# Patient Record
Sex: MALE | Race: WHITE
[De-identification: names, ages, dates, MRNs, and addresses within clinical notes are randomized per-mention and may not be internally consistent; named-entity substitution may affect disease eponyms.]

---

## 2018-12-04 ENCOUNTER — HOSPITAL ENCOUNTER (EMERGENCY)
Dept: HOSPITAL 74 - JER | Age: 58
LOS: 1 days | Discharge: HOME | End: 2018-12-05
Payer: COMMERCIAL

## 2018-12-04 VITALS — SYSTOLIC BLOOD PRESSURE: 144 MMHG | DIASTOLIC BLOOD PRESSURE: 94 MMHG | HEART RATE: 99 BPM | TEMPERATURE: 97.7 F

## 2018-12-04 VITALS — BODY MASS INDEX: 36.6 KG/M2

## 2018-12-04 DIAGNOSIS — M25.561: ICD-10-CM

## 2018-12-04 DIAGNOSIS — M19.90: ICD-10-CM

## 2018-12-04 DIAGNOSIS — Q85.00: ICD-10-CM
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
## 2018-12-04 DIAGNOSIS — Z99.89: ICD-10-CM

## 2018-12-04 DIAGNOSIS — M25.512: ICD-10-CM
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
## 2018-12-04 DIAGNOSIS — M25.562: Primary | ICD-10-CM

## 2018-12-04 DIAGNOSIS — R26.89: ICD-10-CM

## 2018-12-04 DIAGNOSIS — G62.9: ICD-10-CM
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
## 2018-12-04 DIAGNOSIS — Z91.81: ICD-10-CM
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
## 2018-12-04 DIAGNOSIS — R26.81: ICD-10-CM

## 2018-12-04 DIAGNOSIS — M25.511: ICD-10-CM

## 2018-12-04 NOTE — PDOC
Attending Attestation





- Resident


Resident Name: Jerry Moreno





- ED Attending Attestation


I have performed the following: I have examined & evaluated the patient, The 

case was reviewed & discussed with the resident, I agree w/resident's findings 

& plan, Exceptions are as noted





- HPI


HPI: 





12/04/18 20:18


Mr Vera is a 56 yo yo M h/o Neurofibromatosis, peripheral neuropathy, 

arthritis who presents to the ER via ems s/p fall down stairs


Pt was in his usual state of health when he sustained a slip and fall down 15 

wooden stairs (not carpeted)


No LOC, No amnesia


Unlikely head trauma


Pt was unable to get himself to standing 


His roommate called EMS


Pt has a history of arthritis and has worsening shoulder, knee and back pain


[No headache


Of note: pt states that he has been falling more frequently - he estimates that 

he has fallen 25 times since July


Most recent fall was a few days ago in which he sustained right foot pain (

evaluated at the urgent care where x rays were reportedly negative)


Pt has been ambulatory with the assistance of a walker


12/04/18 20:23





12/04/18 20:39








- Physicial Exam


PE: 





12/04/18 20:31


Multiple neurofibromas readily apparent on visual inspection


A & O x 3


No external signs of head trauma


PERRLA, EOMI


RRR


CTA b/l


No abd tenderness


Pelvis is stable


Moves extremities with no limitations


No deformities noted


Right foot swollen, bruised, diffusely tender to palpation





- Medical Decision Making





12/04/18 20:36


S/p mechanical fall


Pt has increasing number of falls over the past few months


Currently denies major injury





Will do:


CT head and C spine


Xrays


Pain medications 


Re assess








*DC/Admit/Observation/Transfer


Diagnosis at time of Disposition: 


 Musculoskeletal pain





Fall down stairs


Qualifiers:


 Encounter type: initial encounter Qualified Code(s): W10.8XXA - Fall (on) (from

) other stairs and steps, initial encounter








- Discharge Dispostion


Disposition: HOME


Condition at time of disposition: Stable


Decision to Admit order: No





- Referrals


Referrals: 


Kb Mcclelland MD [Primary Care Provider] - 


Kb Dominguez MD [Staff Physician] - 


Yani Sun MD [Staff Physician] - 


Royer Sandra MD [Staff Physician] - 





- Patient Instructions


Printed Discharge Instructions:  How to Prevent Falls, DI for Musculoskeletal 

Pain


Additional Instructions: 


Thank you for coming in to the ER today


Please be sure to follow up with your primary care physician


Please review your CT reports, the x ray reports will be available for review 

tomorrow


Please take medications as prescribed while supervised (to avoid becoming 

drowsy and increasing your risk of falling)


Please return to the ER for any other concerns or complaints








- Post Discharge Activity

## 2018-12-04 NOTE — PDOC
History of Present Illness





- General


Chief Complaint: Injury


Stated Complaint: FALL


Time Seen by Provider: 12/04/18 19:38





- History of Present Illness


Initial Comments: 





12/04/18 19:59


Mr. Vera is a 58 yo male w/ pmh of neurofibromatosis, osteoarthritis, and 

frequent falls 2/2 balance issues who presents s/p fall earlier today. Patient 

denies head injury or LOC however reports he fell down the stairs due to his 

normal balance issues. Patient is currently complaining of pain to his L/R 

knees and L/R shoulders. Patient has no other complaints at this time.





The patient denies chest pain, shortness of breath, and headache. Denies fever, 

chills, nausea, vomit, diarrhea and constipation. Denies dysuria, frequency, 

urgency and hematuria. 





Past History





- Past Medical History


Allergies/Adverse Reactions: 


 Allergies











Allergy/AdvReac Type Severity Reaction Status Date / Time


 


No Known Allergies Allergy   Verified 12/04/18 19:36











Home Medications: 


Ambulatory Orders





Acetaminophen W/ Codeine #3 [Tylenol # 3 -] 1 tab PO TID PRN #12 tablet MDD 3 12 /04/18 


Cholecalciferol (Vitamin D3) [Vitamin D3] 1,000 cap DAILY 12/04/18 


Cyanocobalamin (Vitamin B-12) [Vitamin B-12] 1,000 mcg PO DAILY 12/04/18 


Ibuprofen [Motrin -] 400 mg PO Q6H 12/04/18 


Levothyroxine [Synthroid -] 112 mcg PO DAILY 12/04/18 











- Suicide/Smoking/Psychosocial Hx


Smoking History: Never smoked


Hx Alcohol Use: Yes


Drug/Substance Use Hx: Yes





**Review of Systems





- Review of Systems


Comments:: 





12/04/18 20:01


GENERAL/CONSTITUTIONAL: No fever or chills. No weakness.


HEAD, EYES, EARS, NOSE AND THROAT: No change in vision. No ear pain or 

discharge. No sore throat.


CARDIOVASCULAR: No chest pain or shortness of breath


RESPIRATORY: No cough, wheezing, or hemoptysis.


GASTROINTESTINAL: No nausea, vomiting, diarrhea or constipation.


GENITOURINARY: No dysuria, frequency, or change in urination.


MUSCULOSKELETAL: +MARIELOS Shoulder and knee pain patient reports is chronic however 

exacerbated s/p fall.


SKIN: No rash


NEUROLOGIC: +Frequent balance problems as described. No headache, loss of 

consciousness, or change in strength/sensation.


ENDOCRINE: No increased thirst. No abnormal weight change


HEMATOLOGIC/LYMPHATIC: No anemia, easy bleeding, or history of blood clots.


ALLERGIC/IMMUNOLOGIC: No hives or skin allergy.











*Physical Exam





- Physical Exam


Comments: 





12/04/18 20:01


GENERAL: +Patient obese w/ numerous nodules c/w Neurofibromatosis primarily 

over face and chest. Awake, alert, and fully oriented, in no acute distress


HEAD: No signs of trauma, normocephalic, atraumatic 


EYES: PERRLA, EOMI, sclera anicteric, conjunctiva clear


ENT: Auricles normal inspection, hearing grossly normal, nares patent, 

oropharynx clear without


exudates. Moist mucosa


NECK: Normal ROM, supple, no lymphadenopathy, JVD, or masses


LUNGS: No distress, speaks full sentences, clear to auscultation bilaterally 


HEART: Regular rate and rhythm, normal S1 and S2, no murmurs, rubs or gallops, 

peripheral pulses normal and equal bilaterally. 


ABDOMEN: Soft, nontender, normoactive bowel sounds. No guarding, no rebound. No 

masses


EXTREMITIES: Normal inspection, Normal range of motion, no edema. No clubbing 

or cyanosis. 


NEUROLOGICAL: Cranial nerves II through XII grossly intact. Normal speech, 

normal gait, no focal sensorimotor deficits 


SKIN: Warm, Dry, normal turgor, no rashes or lesions noted. 





Medical Decision Making





- Medical Decision Making





12/04/18 23:57


Mr. Vera is a 58 yo male w/ pmh as described who presents s/p fall. Patient 

alert and oriented and describing pain in joints without other concerning 

factors. XR of knees, shoulders, pelvis, foot sent as well as head and c-spine 

CT. All imaging negative. Discharging to home. Patient will f/u w/ PCP Dr. Mcclelland 

for further evaluation. No concern for acute process at this time.





*DC/Admit/Observation/Transfer


Diagnosis at time of Disposition: 


 Musculoskeletal pain





Fall down stairs


Qualifiers:


 Encounter type: initial encounter Qualified Code(s): W10.8XXA - Fall (on) (from

) other stairs and steps, initial encounter








- Discharge Dispostion


Disposition: HOME


Condition at time of disposition: Stable





- Prescriptions


Prescriptions: 


Acetaminophen W/ Codeine #3 [Tylenol # 3 -] 1 tab PO TID PRN #12 tablet MDD 3


 PRN Reason: Severe Pain





- Referrals


Referrals: 


Royer Sandra MD [Staff Physician] - 


Kb Dominguez MD [Staff Physician] - 


Yani Sun MD [Staff Physician] - 


Kb Mcclelland MD [Primary Care Provider] - 





- Patient Instructions


Printed Discharge Instructions:  How to Prevent Falls, DI for Musculoskeletal 

Pain


Additional Instructions: 


Thank you for coming in to the ER today


Please be sure to follow up with your primary care physician


Please review your CT reports, the x ray reports will be available for review 

tomorrow


Please take medications as prescribed while supervised (to avoid becoming 

drowsy and increasing your risk of falling)


Please return to the ER for any other concerns or complaints








- Post Discharge Activity

## 2024-08-09 ENCOUNTER — HOSPITAL ENCOUNTER (EMERGENCY)
Dept: HOSPITAL 74 - JER | Age: 64
Discharge: HOME | End: 2024-08-09
Payer: COMMERCIAL

## 2024-08-09 VITALS — TEMPERATURE: 97.9 F

## 2024-08-09 VITALS — RESPIRATION RATE: 18 BRPM | HEART RATE: 97 BPM

## 2024-08-09 VITALS — SYSTOLIC BLOOD PRESSURE: 111 MMHG | DIASTOLIC BLOOD PRESSURE: 66 MMHG

## 2024-08-09 VITALS — BODY MASS INDEX: 29.9 KG/M2

## 2024-08-09 DIAGNOSIS — R10.30: ICD-10-CM

## 2024-08-09 DIAGNOSIS — R33.8: ICD-10-CM

## 2024-08-09 DIAGNOSIS — N40.1: Primary | ICD-10-CM

## 2024-08-09 LAB
ALBUMIN SERPL-MCNC: 3.5 G/DL (ref 3.4–5)
ALP SERPL-CCNC: 61 U/L (ref 45–117)
ALT SERPL-CCNC: 17 U/L (ref 13–61)
ANION GAP SERPL CALC-SCNC: 9 MMOL/L (ref 4–13)
APPEARANCE UR: CLEAR
AST SERPL-CCNC: 11 U/L (ref 15–37)
BACTERIA # UR AUTO: 4 /UL (ref 0–1359)
BASOPHILS # BLD: 0.4 % (ref 0–2)
BILIRUB SERPL-MCNC: 0.8 MG/DL (ref 0.2–1)
BILIRUB UR STRIP.AUTO-MCNC: NEGATIVE MG/DL
BUN SERPL-MCNC: 12.7 MG/DL (ref 7–18)
CALCIUM SERPL-MCNC: 9.9 MG/DL (ref 8.5–10.1)
CASTS URNS QL MICRO: 0 /UL (ref 0–3.1)
CHLORIDE SERPL-SCNC: 109 MMOL/L (ref 98–107)
CO2 SERPL-SCNC: 23 MMOL/L (ref 21–32)
COLOR UR: YELLOW
CREAT SERPL-MCNC: 0.8 MG/DL (ref 0.55–1.3)
DEPRECATED RDW RBC AUTO: 13.7 % (ref 11.9–15.9)
EOSINOPHIL # BLD: 0.5 % (ref 0–4.5)
EPITH CASTS URNS QL MICRO: 2 /UL (ref 0–25.1)
GLUCOSE SERPL-MCNC: 95 MG/DL (ref 74–106)
HCT VFR BLD CALC: 41.4 % (ref 35.4–49)
HGB BLD-MCNC: 14.3 GM/DL (ref 11.7–16.9)
INR BLD: 1.19 (ref 0.83–1.09)
KETONES UR QL STRIP: (no result)
LEUKOCYTE ESTERASE UR QL STRIP.AUTO: NEGATIVE
LYMPHOCYTES # BLD: 8.6 % (ref 8–40)
MCH RBC QN AUTO: 32 PG (ref 25.7–33.7)
MCHC RBC AUTO-ENTMCNC: 34.5 G/DL (ref 32–35.9)
MCV RBC: 92.9 FL (ref 80–96)
MONOCYTES # BLD AUTO: 8 % (ref 3.8–10.2)
NEUTROPHILS # BLD: 82.5 % (ref 42.8–82.8)
NITRITE UR QL STRIP: NEGATIVE
PH UR: 5.5 [PH] (ref 5–8)
PLATELET # BLD AUTO: 266 10^3/UL (ref 134–434)
PMV BLD: 8.7 FL (ref 7.5–11.1)
POTASSIUM SERPLBLD-SCNC: 4 MMOL/L (ref 3.5–5.1)
PROT SERPL-MCNC: 6.9 G/DL (ref 6.4–8.2)
PROT UR QL STRIP: NEGATIVE
PROT UR QL STRIP: NEGATIVE
PT PNL PPP: 13.6 SEC (ref 9.7–13)
RBC # BLD AUTO: 37 /UL (ref 0–23.9)
RBC # BLD AUTO: 4.45 M/MM3 (ref 4–5.6)
SODIUM SERPL-SCNC: 140 MMOL/L (ref 136–145)
SP GR UR: 1.01 (ref 1.01–1.03)
UROBILINOGEN UR STRIP-MCNC: 0.2 MG/DL (ref 0.2–1)
WBC # BLD AUTO: 12.7 K/MM3 (ref 4–10)
WBC # UR AUTO: 6 /UL (ref 0–25.8)

## 2024-08-09 PROCEDURE — 3E033NZ INTRODUCTION OF ANALGESICS, HYPNOTICS, SEDATIVES INTO PERIPHERAL VEIN, PERCUTANEOUS APPROACH: ICD-10-PCS | Performed by: STUDENT IN AN ORGANIZED HEALTH CARE EDUCATION/TRAINING PROGRAM

## 2024-08-09 RX ADMIN — ACETAMINOPHEN ONE MG: 10 INJECTION, SOLUTION INTRAVENOUS at 09:30

## 2024-09-04 ENCOUNTER — HOSPITAL ENCOUNTER (OUTPATIENT)
Dept: HOSPITAL 74 - JASUSAT | Age: 64
LOS: 1 days | Discharge: HOME | End: 2024-09-05
Attending: UROLOGY
Payer: COMMERCIAL

## 2024-09-04 DIAGNOSIS — N40.0: Primary | ICD-10-CM

## 2024-09-04 DIAGNOSIS — R33.9: ICD-10-CM

## 2024-09-04 PROCEDURE — A4346 CATH INDW FOLEY 3 WAY: HCPCS

## 2024-09-04 PROCEDURE — 0VT08ZZ RESECTION OF PROSTATE, VIA NATURAL OR ARTIFICIAL OPENING ENDOSCOPIC: ICD-10-PCS | Performed by: UROLOGY

## 2024-09-04 RX ADMIN — ACETAMINOPHEN ONE MG: 10 INJECTION, SOLUTION INTRAVENOUS at 12:16

## 2024-09-04 RX ADMIN — CEFAZOLIN SCH MLS/HR: 2 INJECTION, POWDER, FOR SOLUTION INTRAMUSCULAR; INTRAVENOUS at 19:01

## 2024-09-04 RX ADMIN — ACETAMINOPHEN PRN MG: 325 TABLET ORAL at 21:45

## 2024-09-04 RX ADMIN — SODIUM CHLORIDE, POTASSIUM CHLORIDE, SODIUM LACTATE AND CALCIUM CHLORIDE SCH: 600; 310; 30; 20 INJECTION, SOLUTION INTRAVENOUS at 17:32

## 2024-09-04 RX ADMIN — DEXTROSE AND SODIUM CHLORIDE SCH MLS: 5; 450 INJECTION, SOLUTION INTRAVENOUS at 13:00

## 2024-09-05 VITALS
TEMPERATURE: 98.1 F | HEART RATE: 96 BPM | SYSTOLIC BLOOD PRESSURE: 112 MMHG | RESPIRATION RATE: 20 BRPM | DIASTOLIC BLOOD PRESSURE: 56 MMHG

## 2024-09-05 LAB
ANION GAP SERPL CALC-SCNC: 6 MMOL/L (ref 4–13)
ANISOCYTOSIS BLD QL: 0
BASO STIPL BLD QL SMEAR: (no result)
BASOPHILS # BLD: 0.1 % (ref 0–2)
BUN SERPL-MCNC: 17.1 MG/DL (ref 7–18)
CALCIUM SERPL-MCNC: 8.7 MG/DL (ref 8.5–10.1)
CHLORIDE SERPL-SCNC: 102 MMOL/L (ref 98–107)
CO2 SERPL-SCNC: 27 MMOL/L (ref 21–32)
CREAT SERPL-MCNC: 1 MG/DL (ref 0.55–1.3)
DEPRECATED RDW RBC AUTO: 14.4 % (ref 11.9–15.9)
EOSINOPHIL # BLD: 0.1 % (ref 0–4.5)
GLUCOSE SERPL-MCNC: 135 MG/DL (ref 74–106)
HCT VFR BLD CALC: 26 % (ref 35.4–49)
HGB BLD-MCNC: 8.8 GM/DL (ref 11.7–16.9)
LYMPHOCYTES # BLD: 6.6 % (ref 8–40)
MACROCYTES BLD QL: 0
MCH RBC QN AUTO: 31.7 PG (ref 25.7–33.7)
MCHC RBC AUTO-ENTMCNC: 34 G/DL (ref 32–35.9)
MCV RBC: 93.2 FL (ref 80–96)
MONOCYTES # BLD AUTO: 7.8 % (ref 3.8–10.2)
NEUTROPHILS # BLD: 85.4 % (ref 42.8–82.8)
PLATELET # BLD AUTO: 333 10^3/UL (ref 134–434)
PMV BLD: 8.7 FL (ref 7.5–11.1)
POTASSIUM SERPLBLD-SCNC: 3.8 MMOL/L (ref 3.5–5.1)
RBC # BLD AUTO: 2.79 M/MM3 (ref 4–5.6)
SODIUM SERPL-SCNC: 135 MMOL/L (ref 136–145)
WBC # BLD AUTO: 28.2 K/MM3 (ref 4–10)

## 2024-09-05 RX ADMIN — POLYETHYLENE GLYCOL 3350 ONE GM: 17 POWDER, FOR SOLUTION ORAL at 09:57

## 2024-09-05 RX ADMIN — LEVOTHYROXINE SODIUM SCH MCG: 112 TABLET ORAL at 08:01

## 2024-10-12 ENCOUNTER — HOSPITAL ENCOUNTER (EMERGENCY)
Dept: HOSPITAL 74 - JER | Age: 64
Discharge: HOME | End: 2024-10-12
Payer: COMMERCIAL

## 2024-10-12 VITALS
DIASTOLIC BLOOD PRESSURE: 80 MMHG | RESPIRATION RATE: 20 BRPM | SYSTOLIC BLOOD PRESSURE: 128 MMHG | HEART RATE: 108 BPM | TEMPERATURE: 98.1 F

## 2024-10-12 VITALS — BODY MASS INDEX: 29.5 KG/M2

## 2024-10-12 DIAGNOSIS — R30.0: ICD-10-CM

## 2024-10-12 DIAGNOSIS — R33.9: ICD-10-CM

## 2024-10-12 DIAGNOSIS — N30.01: Primary | ICD-10-CM

## 2024-10-12 DIAGNOSIS — R10.2: ICD-10-CM

## 2024-10-12 LAB
ALBUMIN SERPL-MCNC: 2.8 G/DL (ref 3.4–5)
ALP SERPL-CCNC: 103 U/L (ref 45–117)
ALT SERPL-CCNC: 9 U/L (ref 13–61)
ANION GAP SERPL CALC-SCNC: 9 MMOL/L (ref 4–13)
APPEARANCE UR: (no result)
AST SERPL-CCNC: 7 U/L (ref 15–37)
BACTERIA # UR AUTO: 224.6 /UL (ref 0–1359)
BASOPHILS # BLD: 1 % (ref 0–2)
BILIRUB SERPL-MCNC: 0.4 MG/DL (ref 0.2–1)
BILIRUB UR STRIP.AUTO-MCNC: NEGATIVE MG/DL
BUN SERPL-MCNC: 13.3 MG/DL (ref 7–18)
CALCIUM SERPL-MCNC: 9 MG/DL (ref 8.5–10.1)
CASTS URNS QL MICRO: 1266.42 /UL (ref 0–3.1)
CHLORIDE SERPL-SCNC: 109 MMOL/L (ref 98–107)
CO2 SERPL-SCNC: 20 MMOL/L (ref 21–32)
COLOR UR: YELLOW
CREAT SERPL-MCNC: 1.3 MG/DL (ref 0.55–1.3)
DEPRECATED RDW RBC AUTO: 14.7 % (ref 11.9–15.9)
EOSINOPHIL # BLD: 0.6 % (ref 0–4.5)
EPITH CASTS URNS QL MICRO: 10 /UL (ref 0–25.1)
GLUCOSE SERPL-MCNC: 131 MG/DL (ref 74–106)
HCT VFR BLD CALC: 34.6 % (ref 35.4–49)
HGB BLD-MCNC: 11.3 GM/DL (ref 11.7–16.9)
KETONES UR QL STRIP: (no result)
LEUKOCYTE ESTERASE UR QL STRIP.AUTO: (no result)
LYMPHOCYTES # BLD: 10 % (ref 8–40)
MCH RBC QN AUTO: 28.9 PG (ref 25.7–33.7)
MCHC RBC AUTO-ENTMCNC: 32.8 G/DL (ref 32–35.9)
MCV RBC: 88.1 FL (ref 80–96)
MONOCYTES # BLD AUTO: 9.9 % (ref 3.8–10.2)
NEUTROPHILS # BLD: 78.5 % (ref 42.8–82.8)
NITRITE UR QL STRIP: NEGATIVE
PH UR: 6 [PH] (ref 5–8)
PLATELET # BLD AUTO: 413 10^3/UL (ref 134–434)
PMV BLD: 8.1 FL (ref 7.5–11.1)
POTASSIUM SERPLBLD-SCNC: 3.8 MMOL/L (ref 3.5–5.1)
PROT SERPL-MCNC: 6.5 G/DL (ref 6.4–8.2)
PROT UR QL STRIP: 300
PROT UR QL STRIP: NEGATIVE
RBC # BLD AUTO: 3.92 M/MM3 (ref 4–5.6)
RBC # BLD AUTO: 540.6 /UL (ref 0–23.9)
SODIUM SERPL-SCNC: 139 MMOL/L (ref 136–145)
SP GR UR: 1.02 (ref 1.01–1.03)
UROBILINOGEN UR STRIP-MCNC: 1 MG/DL (ref 0.2–1)
WBC # BLD AUTO: 11.6 K/MM3 (ref 4–10)
WBC # UR AUTO: (no result) /UL (ref 0–25.8)

## 2024-10-12 PROCEDURE — 0T2BX0Z CHANGE DRAINAGE DEVICE IN BLADDER, EXTERNAL APPROACH: ICD-10-PCS | Performed by: EMERGENCY MEDICINE

## 2024-10-12 RX ADMIN — SULFAMETHOXAZOLE AND TRIMETHOPRIM ONE EACH: 800; 160 TABLET ORAL at 22:11
